# Patient Record
Sex: MALE | Race: AMERICAN INDIAN OR ALASKA NATIVE
[De-identification: names, ages, dates, MRNs, and addresses within clinical notes are randomized per-mention and may not be internally consistent; named-entity substitution may affect disease eponyms.]

---

## 2019-07-02 ENCOUNTER — HOSPITAL ENCOUNTER (EMERGENCY)
Dept: HOSPITAL 5 - ED | Age: 44
Discharge: HOME | End: 2019-07-02
Payer: MEDICAID

## 2019-07-02 VITALS — DIASTOLIC BLOOD PRESSURE: 74 MMHG | SYSTOLIC BLOOD PRESSURE: 121 MMHG

## 2019-07-02 DIAGNOSIS — Z79.899: ICD-10-CM

## 2019-07-02 DIAGNOSIS — H61.21: Primary | ICD-10-CM

## 2019-07-02 DIAGNOSIS — F12.10: ICD-10-CM

## 2019-07-02 DIAGNOSIS — I10: ICD-10-CM

## 2019-07-02 DIAGNOSIS — Z79.82: ICD-10-CM

## 2019-07-02 DIAGNOSIS — Z86.73: ICD-10-CM

## 2019-07-02 NOTE — EMERGENCY DEPARTMENT REPORT
ED ENT HPI





- General


Chief complaint: Earache


Stated complaint: R EAR BLOCKED


Time Seen by Provider: 07/02/19 09:35


Source: patient


Mode of arrival: Ambulatory


Limitations: No Limitations





- History of Present Illness


Initial comments: 





Patient is a 43-year-old male since the emergency room with complaints of his 

right ear being "plugged" for a week.  He denies any ear pain, drainage, or 

fever.  He does not report any foreign body. He states he has had some 

difficulty hearing from the right ear.  He states he tried over-the-counter 

eardrops without much relief.  He states he did not try any removal as he did 

not want to put anything inside the ear.  He has a past medical history is HTN, 

CVA, seizure disorder.





- Related Data


                                  Previous Rx's











 Medication  Instructions  Recorded  Last Taken  Type


 


Aspirin 325 mg PO QDAY #30 tablet 05/02/14 Unknown Rx


 


Lipase/Protease/Amylase [Pancreaze 1 each FEEDTUBE PRN PRN #30 capsule 05/02/14 

Unknown Rx





Dr 10,500 Unit]    


 


Metoprolol [Lopressor TAB] 50 mg PO BID #60 tablet 05/02/14 Unknown Rx


 


Pantoprazole [Protonix TAB] 40 mg PO QDAY #30 tablet 05/02/14 Unknown Rx


 


Warfarin [Coumadin] 3 mg PO DAILY@1700 #30 tablet 05/02/14 Unknown Rx


 


amLODIPine [Norvasc] 10 mg PO QDAY #30 tablet 05/02/14 Unknown Rx


 


hydrALAZINE [Apresoline TAB] 50 mg PO Q8HR #90 tablet 05/02/14 Unknown Rx


 


levETIRAcetam [Keppra ORAL LIQ] 500 mg PO BID #60 bottle 05/02/14 Unknown Rx











                                    Allergies











Allergy/AdvReac Type Severity Reaction Status Date / Time


 


No Known Allergies Allergy   Verified 03/09/14 06:57














ED Dental HPI





- General


Chief complaint: Earache


Stated complaint: R EAR BLOCKED


Time Seen by Provider: 07/02/19 09:35


Source: patient


Mode of arrival: Ambulatory


Limitations: No Limitations





- Related Data


                                  Previous Rx's











 Medication  Instructions  Recorded  Last Taken  Type


 


Aspirin 325 mg PO QDAY #30 tablet 05/02/14 Unknown Rx


 


Lipase/Protease/Amylase [Pancreaze 1 each FEEDTUBE PRN PRN #30 capsule 05/02/14 

Unknown Rx





Dr 10,500 Unit]    


 


Metoprolol [Lopressor TAB] 50 mg PO BID #60 tablet 05/02/14 Unknown Rx


 


Pantoprazole [Protonix TAB] 40 mg PO QDAY #30 tablet 05/02/14 Unknown Rx


 


Warfarin [Coumadin] 3 mg PO DAILY@1700 #30 tablet 05/02/14 Unknown Rx


 


amLODIPine [Norvasc] 10 mg PO QDAY #30 tablet 05/02/14 Unknown Rx


 


hydrALAZINE [Apresoline TAB] 50 mg PO Q8HR #90 tablet 05/02/14 Unknown Rx


 


levETIRAcetam [Keppra ORAL LIQ] 500 mg PO BID #60 bottle 05/02/14 Unknown Rx











                                    Allergies











Allergy/AdvReac Type Severity Reaction Status Date / Time


 


No Known Allergies Allergy   Verified 03/09/14 06:57














ED Review of Systems


ROS: 


Stated complaint: R EAR BLOCKED


Other details as noted in HPI





Comment: All other systems reviewed and negative





ED Past Medical Hx





- Past Medical History


Previous Medical History?: Yes


Hx Hypertension: Yes


Hx CVA: Yes


Hx Congestive Heart Failure: No


Hx Diabetes: No


Hx Asthma: No


Hx COPD: No


Hx HIV: No





- Surgical History


Past Surgical History?: Yes


Hx Open Heart Surgery: No


Hx Cholecystectomy: No


Hx Appendectomy: No


Hx Breast Surgery: No


Additional Surgical History: shoulder surgery





- Social History


Smoking Status: Never Smoker


Substance Use Type: Alcohol, Marijuana





- Medications


Home Medications: 


                                Home Medications











 Medication  Instructions  Recorded  Confirmed  Last Taken  Type


 


Aspirin 325 mg PO QDAY #30 tablet 05/02/14  Unknown Rx


 


Lipase/Protease/Amylase [Pancreaze 1 each FEEDTUBE PRN PRN #30 capsule 05/02/14 

 Unknown Rx





 10,500 Unit]     


 


Metoprolol [Lopressor TAB] 50 mg PO BID #60 tablet 05/02/14  Unknown Rx


 


Pantoprazole [Protonix TAB] 40 mg PO QDAY #30 tablet 05/02/14  Unknown Rx


 


Warfarin [Coumadin] 3 mg PO DAILY@1700 #30 tablet 05/02/14  Unknown Rx


 


amLODIPine [Norvasc] 10 mg PO QDAY #30 tablet 05/02/14  Unknown Rx


 


hydrALAZINE [Apresoline TAB] 50 mg PO Q8HR #90 tablet 05/02/14  Unknown Rx


 


levETIRAcetam [Keppra ORAL LIQ] 500 mg PO BID #60 bottle 05/02/14  Unknown Rx














ED Physical Exam





- General


Limitations: No Limitations


General appearance: alert, in no apparent distress





- Head


Head exam: Present: atraumatic, normocephalic





- Eye


Eye exam: Present: normal appearance, PERRL, EOMI





- ENT


ENT exam: Present: normal orophraynx, mucous membranes moist, other (left TM and

canal are normal, right canal with ear wax present not completely impacted still

able to visualize part of the the right TM which appears normal, no TM 

perforation visuzlied)





- Neurological Exam


Neurological exam: Present: alert, oriented X3





- Psychiatric


Psychiatric exam: Present: normal affect, normal mood





- Skin


Skin exam: Present: warm, dry, intact





ED Course


                                   Vital Signs











  07/02/19 07/02/19





  09:16 11:21


 


Temperature 98.2 F 


 


Pulse Rate 61 60


 


Respiratory 16 16





Rate  


 


Blood Pressure 120/75 


 


Blood Pressure  121/74





[Left]  


 


O2 Sat by Pulse 98 98





Oximetry  














- Ear Wax Removal


  ** Right Ear


Ear Canal Irrigated by: other (PA-C)


Ear Canal Irrigated With: warm saline with H2O2 using syringe/angiocath


Results: Re-examined: cerumen removed completel


TM Visible: TM(s) intact, normal appe


Ear Canal: atraumatic


Patient Tolerated Procedure: well


Complications: no problems





ED Medical Decision Making





- Medical Decision Making





Patient is a 43-year-old male since the emergency room with complaints of his 

right ear being "plugged" for a week.  He denies any ear pain, drainage, or 

fever.  He does not report any foreign body. He states he has had some 

difficulty hearing from the right ear.  He states he tried over-the-counter 

eardrops without much relief.  He states he did not try any removal as he did 

not want to put anything inside the ear.  He has a past medical history is HTN, 

CVA, seizure disorder. on exam: left TM and canal are normal, right canal with 

ear wax present not completely impacted still able to visualize part of the the 

right TM which appears normal, no TM perforation visuzlied. ear wax removed 

completely per procedure note revealing a normal TM and canal. pt states his 

hearing is much better. advised pt to please follow up with primary care doctor 

in the next 2-3 days.  Return to the emergency room for any new or worsening 

symptoms.


Critical care attestation.: 


If time is entered above; I have spent that time in minutes in the direct care 

of this critically ill patient, excluding procedure time.








ED Disposition


Clinical Impression: 


Cerumen impaction


Qualifiers:


 Laterality: right Qualified Code(s): H61.21 - Impacted cerumen, right ear





Disposition: DC-01 TO HOME OR SELFCARE


Is pt being admited?: No


Does the pt Need Aspirin: No


Condition: Stable


Instructions:  Cerumen Impaction (ED)


Additional Instructions: 


Please follow up with primary care doctor in the next 2-3 days.  Return to the 

emergency room for any new or worsening symptoms.


Referrals: 


[facility] MD DEAN [Primary Care Provider] - 2-3 Days


Time of Disposition: 10:48


Print Language: ENGLISH